# Patient Record
Sex: FEMALE
[De-identification: names, ages, dates, MRNs, and addresses within clinical notes are randomized per-mention and may not be internally consistent; named-entity substitution may affect disease eponyms.]

---

## 2018-03-14 ENCOUNTER — HOSPITAL ENCOUNTER (INPATIENT)
Dept: HOSPITAL 68 - SDA | Age: 53
LOS: 1 days | DRG: 621 | End: 2018-03-15
Attending: SURGERY | Admitting: SURGERY
Payer: COMMERCIAL

## 2018-03-14 VITALS — DIASTOLIC BLOOD PRESSURE: 62 MMHG | SYSTOLIC BLOOD PRESSURE: 138 MMHG

## 2018-03-14 VITALS — HEIGHT: 64 IN | BODY MASS INDEX: 37.56 KG/M2 | WEIGHT: 220 LBS

## 2018-03-14 VITALS — DIASTOLIC BLOOD PRESSURE: 84 MMHG | SYSTOLIC BLOOD PRESSURE: 138 MMHG

## 2018-03-14 DIAGNOSIS — Z90.49: ICD-10-CM

## 2018-03-14 DIAGNOSIS — K21.9: ICD-10-CM

## 2018-03-14 DIAGNOSIS — E66.01: Primary | ICD-10-CM

## 2018-03-14 DIAGNOSIS — Z87.891: ICD-10-CM

## 2018-03-14 PROCEDURE — C9399 UNCLASSIFIED DRUGS OR BIOLOG: HCPCS

## 2018-03-14 PROCEDURE — 0DB64Z3 EXCISION OF STOMACH, PERCUTANEOUS ENDOSCOPIC APPROACH, VERTICAL: ICD-10-PCS | Performed by: SURGERY

## 2018-03-14 NOTE — PN- BARIATRICS
Subjective
Subjective:
post-op check
 
Pt in bed,still nauseous but has improved.  Was vomiting in PACU.  Pt ambulated.
Voided. pain controlled.
Denies HA/CP/SOB
 
Objective
Vital Signs and I&Os
Vital Signs
 
 
Date Time Temp Pulse Resp B/P B/P Pulse O2 O2 Flow FiO2
 
     Mean Ox Delivery Rate 
 
03/14 1201 97.9 76 20 138/84  96 Room Air  
 
 
 Intake & Output
 
 
 03/14 1600 03/14 0800 03/14 0000 03/13 1600 03/13 0800 03/13 0000
 
Intake Total      
 
Output Total      
 
Balance      
 
       
 
Patient 220 lb     
 
Weight      
 
Weight Reported by Patient     
 
Measurement      
 
Method      
 
 
 
Physical Exam:
gen- nad
resp- clear
cardio- rrr
abd-obese, soft, appropriately tender. dressings clean and dry
ext- no calf tenderness
 
Assessment/Plan
Assessment/Plan
52yo F SP lap sleeve gastrectomy POD0. stable but nauseous
 
zofran and trigan for nausea
gi ppx- protonix
gay-op abx for 24h
pain management
stage 1 diet
NPO after midnight- will likely have Upper GI series in AM.
dvt ppx- alps
regular home meds
encourage ambulation and IS
 
 
 
Core Measures
 
Venous Thromboembolism
VTE Risk Factors Surgery
No Mechanical VTE Prophylaxis d/t N/A MechProphylax Ordered
No VTE Pharm Prophylaxis d/t NA PharmProphylax ordered

## 2018-03-14 NOTE — PATIENT DISCHARGE INSTRUCTIONS
Acute Coronary Syndrome
 
Inclusion Criteria
At DC or during hospital stay patient has or had the following:
 
Discharge Core Measures
Meds if any: Prescribed or Continued at Discharge
Meds if any: NOT Prescribed or Continued at Discharge
 
Congestive Heart Failure
 
Inclusion Criteria
At DC or during hospital stay patient has or had the following:
 
Discharge Core Measures
Meds if any: Prescribed or Continued at Discharge
Meds if any: NOT Prescribed or Continued at Discharge
 
Cerebrovascular accident
 
Inclusion Criteria
At DC or during hospital stay patient has or had the following:
CVA/TIA Diagnosis No
 
Discharge Core Measures
Meds if any: Prescribed or Continued at Discharge
Meds if any: NOT Prescribed or Continued at Discharge
 
Venous thromboembolism
 
Discharge Core Measures
- Per Current guidelines, there needs to be overlap
- treatment for the first 5 days of Warfarin therapy.
- If discharged on Warfarin prior to 5 days of
- overlap therapy, the patient will need to be
- assessed for post discharge needs including
- *Post discharge parental anticoagulation
- *Warfarin and/or parental anticoagulation education
- *Follow up date to check INR post discharge
Meds if any: Prescribed or Continued at Discharge
Note: Overlap Therapy is Warfarin and Anticoagulant
Meds if any: NOT Prescribed or Continued at Discharge

## 2018-03-14 NOTE — OPERATIVE REPORT
Operative/Inv Procedure Report
Surgery Date: 03/14/18
Name of Procedure:
Laparoscopic Sleeve Gastrectomy
Pre-Operative Diagnosis:
Morbid Obesity BMI 37, JENNIFFER, GERD
Post-Operative Diagnosis:
Same
Estimated Blood Loss: less than 50ml
Surgeon/Assistant:
Orlando Pabon DO
Anesthesia: general endotracheal tube
IV Fluids:
1000 cc
Drains:
None
Specimens:
Stomach
Complications:
None
Condition:
Stable
Operative Indication:
This is a 53-year-old female presented to the office for workup for bariatric 
surgery.  After appropriate workup was completed I discussed with the patient 
the band, the sleeve, and the gastric bypass.  The patient chose to undergo a 
sleeve gastrectomy.  All risks including but not limited to bleeding, infection,
leak, stricture, injury to surrounding bowel/esophagus/stomach/liver/spleen, 
long-term reflux, DVT/PE, and mortality of 1/1000 patients were discussed in 
detail.  The patient understood everything and decided to proceed.
 
Operative/Procedure Note
Note:
The patient was brought to the operating room and placed on the operating room 
table in supine position.  Venodyne stockings were placed and adequate general 
endotracheal anesthesia was obtained.  The patient was prepped and draped in 
standard surgical fashion.  Began the procedure by making a 2 cm transverse 
incision supraumbilically and slightly to the left of the midline.  Then using a
12 mm clear Visiport and a 10 mm 0 laparoscope, the abdominal cavity was 
accessed.  Great care was taken to go through the anterior rectus sheath, the 
posterior rectus sheath, and through the peritoneum.  Once we entered the 
peritoneum the abdominal cavity was insufflated to 15 mmHg.  Upon initial 
examination no obvious gross pathology was seen.  Accessory trocars were placed,
5 mm in the epigastrium for the Pedro liver retractor.  The retractor was 
inserted and the liver was retracted anteriorly exposing the hiatus, no hiatal 
hernia was seen.  5 mm ports were placed in the right and left upper quadrant, a
5 mm left lateral port, and a 15 mm right lateral port. Some adhesions were 
noted in the right upper quadrant from prior open cholecystectomy, those were 
lysed using harmonic scalpel. Began the procedure by mobilizing the greater 
curvature of the stomach approximately 7 cm from the pylorus.  Once the 
retrogastric space was reached the whole greater curvature was mobilized 
maintaining hemostasis using Harmonic scalpel.  Full hiatal dissection was 
performed, no hiatal hernia was seen.  Once the stomach was adequately mobilized
a 38 Lebanese bougie was inserted and placed along the lesser curvature of the 
stomach.  Once the bougie was in the appropriate position we began creating our 
sleeve, two 60 mm black staple loads with seamguard followed by two 60 mm purple
staple loads with seamguard as well and finished with a 45 mm purple plain load.
 Great care was taken to leave ample room at the incisura angularis, to prevent 
any twisting or kinking of the sleeve, to stay lateral to the esophagogastric 
fat pad, and to do a full fundal excision.  At the completion of the staple line
the staple line was examined, it appeared intact and some bleeding was noted 
which was controlled using endoclips.  The bougie was removed, the sleeve was 
lying nicely without any twisting or kinking.  The resected stomach was removed 
through the right lateral port site.  The port and the left upper quadrant were 
irrigated until clear.  All ports were removed under direct visualization no 
obvious bleeding was noted.  The 15 mm port site fascia was closed using 0 
Vicryl suture.  The skin was closed using 4-0 Monocryl.  Steri-Strips and 
dressings were placed.  The patient was successfully extubated and transferred 
to the recovery room in stable condition.  The patient tolerated the procedure 
well with no complications.
Findings:
No hiatal hernia, 38 Fr bougie
CC:
Colleen PENA,Jaqui

## 2018-03-14 NOTE — ADMISSION CORE MEASURES
Acute Coronary Syndrome (CM)
 
ACS Core Measures
Acute Coronary Syndrome Diagnosis No
 
Congestive Heart Failure (NEW)
 
CHF Core Measures
Congestive Heart Failure Diagnosis No
 
Cerebrovascular Accident (NEW)
 
CVA Core Measures
CVA/TIA Diagnosis No
 
Venous Thromboembolism [id number]
 
VTE Core Derrick (View Protocol)
VTE Risk Factors Surgery
No Mechanical VTE Prophylaxis d/t N/A MechProphylax Ordered
No VTE Pharm Prophylaxis d/t NA PharmProphylax ordered
 
Problem List
 
As ranked by this Provider
includes Assessment & Plan
 1. S/P laparoscopic sleeve gastrectomy
 
 2. Morbid obesity
 
 
HOME MEDS
Home Med List
Cyclobenzaprine HCl 10 MG TABLET   1 TAB PO TID PRN MUSCLE RELAXER  (Reported)

## 2018-03-14 NOTE — SURG SHORT-STAY <48HRS DIS SUM
Visit Information
 
Visit Dates
Admission Date:
03/14/18
 
Discharge Date:
3/15/18
 
 
Surgical Short Stay DC Summary
Admission Diagnosis:
Morbid Obesity (BMI 37), JENNIFFER, GERD
Final Diagnosis:
SAME AS ABOVE, S/P
 
Surgery Date: 03/14/18
Name of Procedure:
Laparoscopic Sleeve Gastrectomy
 
Procedure(s):
Surgery Date: 03/14/18
Name of Procedure:
Laparoscopic Sleeve Gastrectomy
 
Summary/Significant Findings:
Electively scheduled laparoscopic sleeve gastrectomy by  on 3/14/18 
for history of morbid obesity (BMI 37), JENNIFFER, and GERD. Started on a stage 1 
bariatric diet post-operatively. Pain control transitioned from iv to oral 
medication. Lovenox teaching done prior to discharge home.
Condition at Discharge:
stable
Discharge Disposition: home or self care
Discharge instructions provided to patient/family: Yes
Post discharge follow-up plan:
one week follow up with 
Copies to:
Colleen PENA,Jaqui

## 2018-03-15 VITALS — SYSTOLIC BLOOD PRESSURE: 129 MMHG | DIASTOLIC BLOOD PRESSURE: 78 MMHG

## 2018-03-15 VITALS — DIASTOLIC BLOOD PRESSURE: 71 MMHG | SYSTOLIC BLOOD PRESSURE: 143 MMHG

## 2018-03-15 LAB
ABSOLUTE GRANULOCYTE CT: 9.1 /CUMM (ref 1.4–6.5)
BASOPHILS # BLD: 0.1 /CUMM (ref 0–0.2)
BASOPHILS NFR BLD: 0.5 % (ref 0–2)
EOSINOPHIL # BLD: 0 /CUMM (ref 0–0.7)
EOSINOPHIL NFR BLD: 0 % (ref 0–5)
ERYTHROCYTE [DISTWIDTH] IN BLOOD BY AUTOMATED COUNT: 14 % (ref 11.5–14.5)
GRANULOCYTES NFR BLD: 84.1 % (ref 42.2–75.2)
HCT VFR BLD CALC: 42.3 % (ref 37–47)
LYMPHOCYTES # BLD: 1.3 /CUMM (ref 1.2–3.4)
MCH RBC QN AUTO: 30.3 PG (ref 27–31)
MCHC RBC AUTO-ENTMCNC: 33.5 G/DL (ref 33–37)
MCV RBC AUTO: 90.5 FL (ref 81–99)
MONOCYTES # BLD: 0.4 /CUMM (ref 0.1–0.6)
PLATELET # BLD: 198 /CUMM (ref 130–400)
PMV BLD AUTO: 9.9 FL (ref 7.4–10.4)
RED BLOOD CELL CT: 4.68 /CUMM (ref 4.2–5.4)
WBC # BLD AUTO: 10.8 /CUMM (ref 4.8–10.8)

## 2018-03-15 NOTE — PN- BARIATRICS
**See Addendum**
Subjective
Subjective:
She reports small emsis last evening and over night.States anti-emetics are 
effective.
She has been ambulating in cooley this morning multiple times.
 
Objective
Vital Signs and I&Os
Vital Signs
 
 
Date Time Temp Pulse Resp B/P B/P Pulse O2 O2 Flow FiO2
 
     Mean Ox Delivery Rate 
 
03/15 0627 98.1 76 18 129/78  96 Room Air  
 
03/15 0600      96 Room Air  
 
03/15 0430 98.6        
 
03/15 0331 98.6        
 
03/14 2202 98.6 84 19 138/62  96 Room Air  
 
03/14 2000       Room Air  
 
03/14 1931       Room Air  
 
03/14 1600      97 Room Air  
 
03/14 1400      96 Room Air  
 
03/14 1201 97.9 76 20 138/84  96 Room Air  
 
 
 Intake & Output
 
 
 03/15 1600 03/15 0800 03/15 0000 03/14 1600 03/14 0800 03/14 0000
 
Intake Total  1000 805 530  
 
Output Total  2000 700 305  
 
Balance  -1000 105 225  
 
       
 
Intake, IV  1000 625 500  
 
Intake, Oral   180 30  
 
Number  0 0   
 
Bowel      
 
Movements      
 
Output,    5  
 
Emesis      
 
Output, Urine  2000 700 300  
 
Patient    220 lb  
 
Weight      
 
Weight    Reported by Patient  
 
Measurement      
 
Method      
 
 
Alert, appropriate, no distress, looks comfortable.
Lungs clear bilat.
Heart  with normal rate
Abdomen obese, soft, non distended.Port sites are clean, dry.
Extremities without edema.
 
Assessment/Plan
Assessment/Plan
s/p lap. Gastric sleeve POD#1 hx of morbid obesity, JENNIFFER.
Stable hemodynamics.
Ambulating multiple times.
Abdominal exam benign.Nausea  improving with anti-emetics.
Voiding well spontaneously.
Pt. awaiting UGI this morning . If normal results, will initiate bariatric diet 
and heplock IVF
Continue ambulation
Oral pain meds. prn 
Cont. chemical DVT prophylaxis. Pt. has script for Lovenox for home.
Anticipate d/c home later today or tomorrow depending on diet tolerance  and 
nausea resolution.
 
 
Core Measures
 
Venous Thromboembolism
VTE Risk Factors Surgery
No Mechanical VTE Prophylaxis d/t N/A MechProphylax Ordered
No VTE Pharm Prophylaxis d/t NA PharmProphylax ordered

## 2018-03-15 NOTE — RADIOLOGY REPORT
EXAMINATION:
FLUOROSCOPY UPPER GI WITH GASTROGRAFIN WITH KUB
 
CLINICAL INFORMATION:
1 day status post gastric sleeve procedure. Postoperative evaluation. Rule
out leak and obstruction.
 
COMPARISON:
None.
 
TECHNIQUE:
 
A preliminary  view of the abdomen was performed. A limited Gastrografin
upper GI study was performed using 30 ml of Gastroview with the patient in
the semiupright position. Multiple spot films and cine fluoroscopy runs were
acquired.
 
FINDINGS:
The preliminary  view of the abdomen demonstrates postsurgical suture
line in the epigastric region. Multiple staples are seen in the right upper
quadrant. A thin wire-like structure is seen projected over the mid and lower
sacrum. Normal bowel gas pattern is seen without abnormal bowel distention
noted.
 
Esophageal distensibility and motility is normal. The GE junction is located
below the level of the diaphragm and no GE reflux seen.
 
The remnant stomach is normal with no abnormal distention or contrast leak
seen. There is prompt emptying of contrast into the duodenum, which is
unremarkable in appearance.
 
FLUOROSCOPY TIME:
0.25 minutes.
 
IMPRESSION:
Unremarkable examination with no evidence of contrast leak or gastric outlet
obstruction.